# Patient Record
Sex: FEMALE | Race: WHITE
[De-identification: names, ages, dates, MRNs, and addresses within clinical notes are randomized per-mention and may not be internally consistent; named-entity substitution may affect disease eponyms.]

---

## 2017-12-15 ENCOUNTER — HOSPITAL ENCOUNTER (OUTPATIENT)
Dept: HOSPITAL 84 - D.MAMMO | Age: 42
Discharge: HOME | End: 2017-12-15
Attending: FAMILY MEDICINE
Payer: MEDICAID

## 2017-12-15 DIAGNOSIS — Z12.31: Primary | ICD-10-CM

## 2018-01-29 ENCOUNTER — HOSPITAL ENCOUNTER (OUTPATIENT)
Dept: HOSPITAL 84 - D.NM | Age: 43
Discharge: HOME | End: 2018-01-29
Attending: SPECIALIST
Payer: MEDICAID

## 2018-01-29 DIAGNOSIS — M54.2: Primary | ICD-10-CM

## 2018-01-29 DIAGNOSIS — M54.5: ICD-10-CM

## 2018-02-08 ENCOUNTER — HOSPITAL ENCOUNTER (OUTPATIENT)
Dept: HOSPITAL 84 - D.MAMMO | Age: 43
Discharge: HOME | End: 2018-02-08
Attending: FAMILY MEDICINE
Payer: MEDICAID

## 2018-02-08 DIAGNOSIS — R92.8: Primary | ICD-10-CM

## 2019-11-21 ENCOUNTER — HOSPITAL ENCOUNTER (OUTPATIENT)
Dept: HOSPITAL 84 - D.LAB | Age: 44
Discharge: HOME | End: 2019-11-21
Attending: ANESTHESIOLOGY
Payer: MEDICAID

## 2019-11-21 DIAGNOSIS — M62.81: Primary | ICD-10-CM

## 2019-11-21 DIAGNOSIS — R52: ICD-10-CM

## 2019-11-21 LAB
BASOPHILS NFR BLD AUTO: 0.3 % (ref 0–2)
EOSINOPHIL NFR BLD: 2.2 % (ref 0–7)
ERYTHROCYTE [DISTWIDTH] IN BLOOD BY AUTOMATED COUNT: 13.5 % (ref 11.5–14.5)
HCT VFR BLD CALC: 44.5 % (ref 36–48)
HGB BLD-MCNC: 14.6 G/DL (ref 12–16)
IMM GRANULOCYTES NFR BLD: 0.2 % (ref 0–5)
LYMPHOCYTES NFR BLD AUTO: 33.4 % (ref 15–50)
MCH RBC QN AUTO: 29 PG (ref 26–34)
MCHC RBC AUTO-ENTMCNC: 32.8 G/DL (ref 31–37)
MCV RBC: 88.3 FL (ref 80–100)
MONOCYTES NFR BLD: 5.8 % (ref 2–11)
NEUTROPHILS NFR BLD AUTO: 58.1 % (ref 40–80)
PLATELET # BLD: 207 10X3/UL (ref 130–400)
PMV BLD AUTO: 9 FL (ref 7.4–10.4)
RBC # BLD AUTO: 5.04 10X6/UL (ref 4–5.4)
WBC # BLD AUTO: 10.4 10X3/UL (ref 4.8–10.8)

## 2020-04-15 ENCOUNTER — HOSPITAL ENCOUNTER (INPATIENT)
Dept: HOSPITAL 84 - D.ER | Age: 45
LOS: 1 days | Discharge: HOME | DRG: 193 | End: 2020-04-16
Attending: FAMILY MEDICINE | Admitting: FAMILY MEDICINE
Payer: MEDICAID

## 2020-04-15 VITALS
WEIGHT: 210 LBS | HEIGHT: 67 IN | WEIGHT: 210 LBS | HEIGHT: 67 IN | BODY MASS INDEX: 32.96 KG/M2 | BODY MASS INDEX: 32.96 KG/M2

## 2020-04-15 VITALS — SYSTOLIC BLOOD PRESSURE: 125 MMHG | DIASTOLIC BLOOD PRESSURE: 75 MMHG

## 2020-04-15 VITALS — DIASTOLIC BLOOD PRESSURE: 61 MMHG | SYSTOLIC BLOOD PRESSURE: 110 MMHG

## 2020-04-15 VITALS — DIASTOLIC BLOOD PRESSURE: 58 MMHG | SYSTOLIC BLOOD PRESSURE: 113 MMHG

## 2020-04-15 DIAGNOSIS — E66.9: ICD-10-CM

## 2020-04-15 DIAGNOSIS — R00.0: ICD-10-CM

## 2020-04-15 DIAGNOSIS — F41.8: ICD-10-CM

## 2020-04-15 DIAGNOSIS — G89.29: ICD-10-CM

## 2020-04-15 DIAGNOSIS — J18.9: Primary | ICD-10-CM

## 2020-04-15 DIAGNOSIS — M79.7: ICD-10-CM

## 2020-04-15 DIAGNOSIS — I10: ICD-10-CM

## 2020-04-15 DIAGNOSIS — J96.21: ICD-10-CM

## 2020-04-15 LAB
ALBUMIN SERPL-MCNC: 3.1 G/DL (ref 3.4–5)
ALP SERPL-CCNC: 82 U/L (ref 30–120)
ALT SERPL-CCNC: 24 U/L (ref 10–68)
ANION GAP SERPL CALC-SCNC: 10.9 MMOL/L (ref 8–16)
APTT BLD: 26.4 SECONDS (ref 22.8–39.4)
BASOPHILS NFR BLD AUTO: 0.3 % (ref 0–2)
BILIRUB SERPL-MCNC: 0.12 MG/DL (ref 0.2–1.3)
BUN SERPL-MCNC: 10 MG/DL (ref 7–18)
CALCIUM SERPL-MCNC: 8.4 MG/DL (ref 8.5–10.1)
CHLORIDE SERPL-SCNC: 107 MMOL/L (ref 98–107)
CK MB SERPL-MCNC: 0.8 U/L (ref 0–3.6)
CK SERPL-CCNC: 48 UL (ref 21–215)
CO2 SERPL-SCNC: 26.9 MMOL/L (ref 21–32)
CREAT SERPL-MCNC: 0.5 MG/DL (ref 0.6–1.3)
CRP SERPL-MCNC: 0.5 MG/DL (ref 0–0.9)
D DIMER PPP FEU-MCNC: 0.28 UG/MLFEU (ref 0.2–0.54)
EOSINOPHIL NFR BLD: 5.6 % (ref 0–7)
ERYTHROCYTE [DISTWIDTH] IN BLOOD BY AUTOMATED COUNT: 14.3 % (ref 11.5–14.5)
FERRITIN SERPL-MCNC: 11 NG/ML (ref 3–244)
GLOBULIN SER-MCNC: 3.7 G/L
GLUCOSE SERPL-MCNC: 121 MG/DL (ref 74–106)
HCT VFR BLD CALC: 39.1 % (ref 36–48)
HGB BLD-MCNC: 12.2 G/DL (ref 12–16)
IMM GRANULOCYTES NFR BLD: 0.5 % (ref 0–5)
INR PPP: 0.94 (ref 0.85–1.17)
LYMPHOCYTES NFR BLD AUTO: 35.8 % (ref 15–50)
MCH RBC QN AUTO: 26.7 PG (ref 26–34)
MCHC RBC AUTO-ENTMCNC: 31.2 G/DL (ref 31–37)
MCV RBC: 85.6 FL (ref 80–100)
MONOCYTES NFR BLD: 6.7 % (ref 2–11)
NEUTROPHILS NFR BLD AUTO: 51.1 % (ref 40–80)
NT-PROBNP SERPL-MCNC: 85 PG/ML (ref 0–125)
OSMOLALITY SERPL CALC.SUM OF ELEC: 280 MOSM/KG (ref 275–300)
PLATELET # BLD: 244 10X3/UL (ref 130–400)
PMV BLD AUTO: 9.3 FL (ref 7.4–10.4)
POTASSIUM SERPL-SCNC: 3.8 MMOL/L (ref 3.5–5.1)
PROT SERPL-MCNC: 6.8 G/DL (ref 6.4–8.2)
PROTHROMBIN TIME: 12.5 SECONDS (ref 11.6–15)
RBC # BLD AUTO: 4.57 10X6/UL (ref 4–5.4)
SODIUM SERPL-SCNC: 141 MMOL/L (ref 136–145)
TROPONIN I SERPL-MCNC: < 0.017 NG/ML (ref 0–0.06)
WBC # BLD AUTO: 9.9 10X3/UL (ref 4.8–10.8)

## 2020-04-15 NOTE — NUR
I HAVE RECIVED THIS PATIENT FROM THE ER. PT IS A&O x4 NO SIGNS OR SYMPTOMS OF
DISTRESS NOTED. RESPIRATIONS EVEN AND UNLABORED. ASSIST PT TO RESTROOM AND
BACK TO BED. PT GAIT IS STEADY. PT ENCOUARGED TO CALL FOR HELP WHEN NEEDED AND
WHEN GETTING TO AND FROM BED. CALL LIGHT WITH IN REACH. WILL CONTINUE TO
MONITOR

## 2020-04-16 VITALS — SYSTOLIC BLOOD PRESSURE: 107 MMHG | DIASTOLIC BLOOD PRESSURE: 54 MMHG

## 2020-04-16 VITALS — DIASTOLIC BLOOD PRESSURE: 61 MMHG | SYSTOLIC BLOOD PRESSURE: 123 MMHG

## 2020-04-16 VITALS — DIASTOLIC BLOOD PRESSURE: 92 MMHG | SYSTOLIC BLOOD PRESSURE: 139 MMHG

## 2020-04-16 LAB
ALBUMIN SERPL-MCNC: 3.2 G/DL (ref 3.4–5)
ALP SERPL-CCNC: 89 U/L (ref 30–120)
ALT SERPL-CCNC: 27 U/L (ref 10–68)
ANION GAP SERPL CALC-SCNC: 13.7 MMOL/L (ref 8–16)
BASOPHILS NFR BLD AUTO: 0.2 % (ref 0–2)
BILIRUB SERPL-MCNC: 0.27 MG/DL (ref 0.2–1.3)
BUN SERPL-MCNC: 9 MG/DL (ref 7–18)
CALCIUM SERPL-MCNC: 8.5 MG/DL (ref 8.5–10.1)
CHLORIDE SERPL-SCNC: 105 MMOL/L (ref 98–107)
CO2 SERPL-SCNC: 25.3 MMOL/L (ref 21–32)
CREAT SERPL-MCNC: 0.8 MG/DL (ref 0.6–1.3)
EOSINOPHIL NFR BLD: 5 % (ref 0–7)
ERYTHROCYTE [DISTWIDTH] IN BLOOD BY AUTOMATED COUNT: 14.3 % (ref 11.5–14.5)
GLOBULIN SER-MCNC: 3.7 G/L
GLUCOSE SERPL-MCNC: 99 MG/DL (ref 74–106)
HCT VFR BLD CALC: 40.7 % (ref 36–48)
HGB BLD-MCNC: 12.6 G/DL (ref 12–16)
IMM GRANULOCYTES NFR BLD: 0.4 % (ref 0–5)
LYMPHOCYTES NFR BLD AUTO: 33.6 % (ref 15–50)
MCH RBC QN AUTO: 26.3 PG (ref 26–34)
MCHC RBC AUTO-ENTMCNC: 31 G/DL (ref 31–37)
MCV RBC: 85 FL (ref 80–100)
MONOCYTES NFR BLD: 6.3 % (ref 2–11)
NEUTROPHILS NFR BLD AUTO: 54.5 % (ref 40–80)
OSMOLALITY SERPL CALC.SUM OF ELEC: 277 MOSM/KG (ref 275–300)
PLATELET # BLD: 214 10X3/UL (ref 130–400)
PMV BLD AUTO: 9.3 FL (ref 7.4–10.4)
POTASSIUM SERPL-SCNC: 4 MMOL/L (ref 3.5–5.1)
PROT SERPL-MCNC: 6.9 G/DL (ref 6.4–8.2)
RBC # BLD AUTO: 4.79 10X6/UL (ref 4–5.4)
SODIUM SERPL-SCNC: 140 MMOL/L (ref 136–145)
WBC # BLD AUTO: 9.7 10X3/UL (ref 4.8–10.8)

## 2020-04-16 NOTE — NUR
Received Faxed Confirmation from Dr. Zaldivar's office COVID19 test negative
from 4/13/2020.  May remove from enhanced droplet isolation.

## 2020-04-16 NOTE — NUR
PT DISCHARGED HOME VIA WHEELCHAIR WITH FAMILY. PIV REMOVED WITH CATHETER TIP
FULLY INTACT. PT SIGNED DISCHARGE INSTRUCTIONS AND REMOVED ALL VALUABLES FROM
THE ROOM.

## 2020-04-16 NOTE — NUR
PT RESTING IN BED WITH EYES CLOSED. EASILY AWAKEN WITH VOICE STIMULATION. NO
SIGNS OF DISTRESS NOTED. RESPIRATIONS EVEN AND UNLABORED. PT HAS NO COMPLAINTS
AT THIS TIME. CALL LIGHT WITH IN REACH AND BED IS IN LOWEST POSITION. PT
REQUEST FOR PERSONAL PADS FOR MENSTRUAL. WILL CONTINUE TO MONITOR